# Patient Record
Sex: MALE | Race: WHITE | NOT HISPANIC OR LATINO | URBAN - METROPOLITAN AREA
[De-identification: names, ages, dates, MRNs, and addresses within clinical notes are randomized per-mention and may not be internally consistent; named-entity substitution may affect disease eponyms.]

---

## 2020-10-12 ENCOUNTER — HOSPITAL ENCOUNTER (EMERGENCY)
Facility: HOSPITAL | Age: 75
Discharge: HOME | End: 2020-10-12
Attending: EMERGENCY MEDICINE
Payer: OTHER GOVERNMENT

## 2020-10-12 VITALS
RESPIRATION RATE: 16 BRPM | SYSTOLIC BLOOD PRESSURE: 144 MMHG | HEIGHT: 63 IN | TEMPERATURE: 98 F | BODY MASS INDEX: 26.58 KG/M2 | DIASTOLIC BLOOD PRESSURE: 74 MMHG | HEART RATE: 62 BPM | OXYGEN SATURATION: 95 % | WEIGHT: 150 LBS

## 2020-10-12 DIAGNOSIS — R07.9 CHEST PAIN: ICD-10-CM

## 2020-10-12 DIAGNOSIS — R07.89 NON-CARDIAC CHEST PAIN: Primary | ICD-10-CM

## 2020-10-12 LAB
ALBUMIN SERPL BCP-MCNC: 4.3 G/DL (ref 3.5–5.2)
ALP SERPL-CCNC: 93 U/L (ref 55–135)
ALT SERPL W/O P-5'-P-CCNC: 16 U/L (ref 10–44)
ANION GAP SERPL CALC-SCNC: 11 MMOL/L (ref 8–16)
AST SERPL-CCNC: 21 U/L (ref 10–40)
BASOPHILS # BLD AUTO: 0.04 K/UL (ref 0–0.2)
BASOPHILS NFR BLD: 0.5 % (ref 0–1.9)
BILIRUB SERPL-MCNC: 0.8 MG/DL (ref 0.1–1)
BNP SERPL-MCNC: 21 PG/ML (ref 0–99)
BUN SERPL-MCNC: 10 MG/DL (ref 8–23)
CALCIUM SERPL-MCNC: 9.2 MG/DL (ref 8.7–10.5)
CHLORIDE SERPL-SCNC: 103 MMOL/L (ref 95–110)
CO2 SERPL-SCNC: 27 MMOL/L (ref 23–29)
CREAT SERPL-MCNC: 0.8 MG/DL (ref 0.5–1.4)
DIFFERENTIAL METHOD: ABNORMAL
EOSINOPHIL # BLD AUTO: 0.1 K/UL (ref 0–0.5)
EOSINOPHIL NFR BLD: 1.6 % (ref 0–8)
ERYTHROCYTE [DISTWIDTH] IN BLOOD BY AUTOMATED COUNT: 12.7 % (ref 11.5–14.5)
EST. GFR  (AFRICAN AMERICAN): >60 ML/MIN/1.73 M^2
EST. GFR  (NON AFRICAN AMERICAN): >60 ML/MIN/1.73 M^2
GLUCOSE SERPL-MCNC: 104 MG/DL (ref 70–110)
HCT VFR BLD AUTO: 45.6 % (ref 40–54)
HGB BLD-MCNC: 14.7 G/DL (ref 14–18)
IMM GRANULOCYTES # BLD AUTO: 0.01 K/UL (ref 0–0.04)
IMM GRANULOCYTES NFR BLD AUTO: 0.1 % (ref 0–0.5)
LYMPHOCYTES # BLD AUTO: 3.1 K/UL (ref 1–4.8)
LYMPHOCYTES NFR BLD: 38.1 % (ref 18–48)
MCH RBC QN AUTO: 31.3 PG (ref 27–31)
MCHC RBC AUTO-ENTMCNC: 32.2 G/DL (ref 32–36)
MCV RBC AUTO: 97 FL (ref 82–98)
MONOCYTES # BLD AUTO: 0.8 K/UL (ref 0.3–1)
MONOCYTES NFR BLD: 9.2 % (ref 4–15)
NEUTROPHILS # BLD AUTO: 4.1 K/UL (ref 1.8–7.7)
NEUTROPHILS NFR BLD: 50.5 % (ref 38–73)
NRBC BLD-RTO: 0 /100 WBC
PLATELET # BLD AUTO: 322 K/UL (ref 150–350)
PMV BLD AUTO: 9.8 FL (ref 9.2–12.9)
POTASSIUM SERPL-SCNC: 4.4 MMOL/L (ref 3.5–5.1)
PROT SERPL-MCNC: 7.1 G/DL (ref 6–8.4)
RBC # BLD AUTO: 4.7 M/UL (ref 4.6–6.2)
SODIUM SERPL-SCNC: 141 MMOL/L (ref 136–145)
TROPONIN I SERPL DL<=0.01 NG/ML-MCNC: 0.01 NG/ML (ref 0–0.03)
TROPONIN I SERPL DL<=0.01 NG/ML-MCNC: 0.01 NG/ML (ref 0–0.03)
WBC # BLD AUTO: 8.13 K/UL (ref 3.9–12.7)

## 2020-10-12 PROCEDURE — 93005 ELECTROCARDIOGRAM TRACING: CPT | Performed by: INTERNAL MEDICINE

## 2020-10-12 PROCEDURE — 93010 ELECTROCARDIOGRAM REPORT: CPT | Mod: ,,, | Performed by: INTERNAL MEDICINE

## 2020-10-12 PROCEDURE — 80053 COMPREHEN METABOLIC PANEL: CPT

## 2020-10-12 PROCEDURE — 93010 EKG 12-LEAD: ICD-10-PCS | Mod: ,,, | Performed by: INTERNAL MEDICINE

## 2020-10-12 PROCEDURE — 83880 ASSAY OF NATRIURETIC PEPTIDE: CPT

## 2020-10-12 PROCEDURE — 99285 EMERGENCY DEPT VISIT HI MDM: CPT | Mod: 25

## 2020-10-12 PROCEDURE — 99285 EMERGENCY DEPT VISIT HI MDM: CPT | Mod: ,,, | Performed by: PHYSICIAN ASSISTANT

## 2020-10-12 PROCEDURE — 25000003 PHARM REV CODE 250: Performed by: PHYSICIAN ASSISTANT

## 2020-10-12 PROCEDURE — 99285 PR EMERGENCY DEPT VISIT,LEVEL V: ICD-10-PCS | Mod: ,,, | Performed by: PHYSICIAN ASSISTANT

## 2020-10-12 PROCEDURE — 85025 COMPLETE CBC W/AUTO DIFF WBC: CPT

## 2020-10-12 PROCEDURE — 84484 ASSAY OF TROPONIN QUANT: CPT | Mod: 91

## 2020-10-12 RX ORDER — NAPROXEN SODIUM 220 MG/1
162 TABLET, FILM COATED ORAL
Status: COMPLETED | OUTPATIENT
Start: 2020-10-12 | End: 2020-10-12

## 2020-10-12 RX ADMIN — ASPIRIN 162 MG: 81 TABLET, CHEWABLE ORAL at 08:10

## 2020-10-12 NOTE — ED PROVIDER NOTES
Encounter Date: 10/12/2020       History     Chief Complaint   Patient presents with    Chest Pain     started this morning, denies cardiac hx     75-year-old male with HTN, HLD presents to the ED with a cc of chest pain. Pt describes tightness across the chest that began around 6:30am today.  Pain occurred at rest, but he felt that it was slightly worse with exertion.  He reports some mild associated SOB. Denies diaphoresis, nausea, vomiting.  Currently, he states that the pain has nearly resolved.  Patient took 2 baby aspirins.  He denies cardiac history, but notes that [facility] which was normal.  He also describes having an echo 3 mos ago, which was also normal.  Patient does report having increased life stressors recently.  Denies cough, fever, recent immobilization, calf pain, leg swelling.  He does note that his father  of a heart attack at age 52.        Review of patient's allergies indicates:  No Known Allergies  Past Medical History:   Diagnosis Date    Hyperlipidemia     Hypertension      No past surgical history on file.  No family history on file.  Social History     Tobacco Use    Smoking status: Not on file   Substance Use Topics    Alcohol use: Not on file    Drug use: Not on file     Review of Systems   Constitutional: Negative for diaphoresis and fever.   HENT: Negative for sore throat.    Respiratory: Negative for shortness of breath.    Cardiovascular: Positive for chest pain. Negative for leg swelling.   Gastrointestinal: Negative for abdominal pain, nausea and vomiting.   Genitourinary: Negative for dysuria.   Musculoskeletal: Negative for back pain.   Skin: Negative for rash.   Neurological: Negative for weakness.   Hematological: Does not bruise/bleed easily.       Physical Exam     Initial Vitals [10/12/20 0734]   BP Pulse Resp Temp SpO2   131/72 69 18 97.6 °F (36.4 °C) 98 %      MAP       --         Physical Exam    Nursing note and vitals  reviewed.  Constitutional: He appears well-developed and well-nourished.  Non-toxic appearance. He does not appear ill. No distress.   HENT:   Head: Normocephalic and atraumatic.   Neck: Normal range of motion. Neck supple.   Cardiovascular: Normal rate and regular rhythm. Exam reveals no gallop, no distant heart sounds and no friction rub.    Murmur heard.  Pulses:       Radial pulses are 2+ on the right side and 2+ on the left side.   Pulmonary/Chest: Effort normal and breath sounds normal. No accessory muscle usage. No tachypnea. No respiratory distress. He has no decreased breath sounds. He has no wheezes. He has no rhonchi. He has no rales.   Abdominal: Soft. Normal appearance. He exhibits no distension and no mass. There is no abdominal tenderness. There is no rigidity and no guarding.   Musculoskeletal:      Comments: No calf tenderness.  No peripheral edema.   Neurological: He is alert.   Skin: No rash noted.         ED Course   Procedures  Labs Reviewed   CBC W/ AUTO DIFFERENTIAL - Abnormal; Notable for the following components:       Result Value    Mean Corpuscular Hemoglobin 31.3 (*)     All other components within normal limits   COMPREHENSIVE METABOLIC PANEL   TROPONIN I   B-TYPE NATRIURETIC PEPTIDE   TROPONIN I     EKG Readings: (Independently Interpreted)   Initial Reading: No STEMI. Rhythm: Sinus Bradycardia. Heart Rate: 58. Ectopy: No Ectopy. Axis: Normal.   Nonspecific T-wave abnormality, lead III     ECG Results          EKG 12-lead (Final result)  Result time 10/12/20 14:35:12    Final result by Interface, Lab In [facility] (10/12/20 14:35:12)                 Narrative:    Test Reason : R07.9,    Vent. Rate : 058 BPM     Atrial Rate : 058 BPM     P-R Int : 126 ms          QRS Dur : 092 ms      QT Int : 380 ms       P-R-T Axes : 024 086 029 degrees     QTc Int : 373 ms    Sinus bradycardia with sinus arrhythmia  Otherwise normal ECG  No previous ECGs available  Confirmed by Jaun Mishra MD ([id number])  on 10/12/2020 2:35:04 PM    Referred By: CHER   SELF           Confirmed By:Jaun Mishra MD                            Imaging Results          X-Ray Chest PA And Lateral (Final result)  Result time 10/12/20 08:45:43    Final result by Luz Vu MD (10/12/20 08:45:43)                 Impression:      No source for chest pain identified.      Electronically signed by: Luz Vu MD  Date:    10/12/2020  Time:    08:45             Narrative:    EXAMINATION:  XR CHEST PA AND LATERAL    CLINICAL HISTORY:  Chest pain, unspecified    TECHNIQUE:  PA and lateral views of the chest were performed.    COMPARISON:  None    FINDINGS:  Hypertrophic calcification at the 1st costochondral junctions represents a normal senescent change.    Mediastinal structures are midline. Cardiac silhouette and pulmonary vascular distribution are normal.    Lung volumes are normal and symmetric. I detect no pulmonary disease, pleural fluid, lymph node enlargement, cardiac decompensation, pneumothorax, pneumomediastinum, pneumoperitoneum or significant osseous abnormality.                                 Medical Decision Making:   History:   Old Medical Records: I decided to obtain old medical records.  Differential Diagnosis:   My differential diagnosis includes but is not limited to:  ACS, pneumothorax, pleural effusion, stress/anxiety, GERD  Independently Interpreted Test(s):   I have ordered and independently interpreted EKG Reading(s) - see prior notes  Clinical Tests:   Lab Tests: Ordered  Radiological Study: Ordered  Medical Tests: Ordered       APC / Resident Notes:   75-year-old male presents for evaluation of chest pain that began this morning.  Vitals are within normal limits.  Patient in no acute distress.  Unremarkable physical exam.  EKG without acute ischemic patterns or other concerning arrhythmia.    Plan:  Troponin x2, ASA, CXR, labs    Troponins are both within normal limits.  BMP within normal limits.   Chest x-ray shows no acute abnormalities.  Patient reported continued improvement of his chest pain on reassessment.  Based on negative workup, I do not suspect cardiac etiology of patient's symptoms.  Given his history of prostate cancer, patient is Wells score of 1 which is low risk for PE.  Based on his history, exam, vitals, I have a low suspicion for PE.  I will discharge patient in stable condition.  Advised patient to take 20 mg of Pepcid for the next week.  Advised him to follow-up with PCP if no improvement in his symptoms.  Strict ED return precautions given.  He verbalized understanding is comfortable with plan.    I have reviewed the patient's records and discussed this case with my supervising physician. Please be advised that this text was dictated with [employer] software and may contain dictation errors.                  Attending Attestation:     Physician Attestation Statement for NP/PA:   I discussed this assessment and plan of this patient with the NP/PA, but I did not personally examine the patient. The face to face encounter was performed by the NP/PA.                            Clinical Impression:       ICD-10-CM ICD-9-CM   1. Non-cardiac chest pain  R07.89 786.59   2. Chest pain  R07.9 786.50                      Disposition:   Disposition: Discharged  Condition: Stable     ED Disposition Condition    Discharge Stable        ED Prescriptions     None        Follow-up Information    None                                      Ana Norton PA-C  10/13/20 0804       Carola Garcia MD  10/13/20 1239

## 2020-10-12 NOTE — DISCHARGE INSTRUCTIONS
Your ED evaluation today was normal.  You should follow[facility] with [facility] if your [facility] continue.     Return to the ER for any new or significantly worsening symptoms such as difficulty breathing, severe chest pain, fever greater than 100.4° or any other worrisome symptoms.    Our goal in the emergency department is to always give you outstanding care and exceptional service. You may receive a survey by mail or e-mail in the next week regarding your experience in our ED. We would greatly appreciate your completing and returning the survey. Your feedback provides us with a way to recognize our staff who give very good care and it helps us learn how to improve when your experience was below our aspiration of excellence.

## 2020-10-12 NOTE — ED NOTES
Patient arrived ambulatory. Placed on cardiac monitor, patient has no needs at this time.  Will continue to monitor.

## 2020-10-12 NOTE — ED NOTES
Patient identifiers for Blayne Garcia 75 y.o. male checked and correct.  Chief Complaint   Patient presents with    Chest Pain     started this morning, denies cardiac hx     No past medical history on file.  Allergies reported: Review of patient's allergies indicates:  No Known Allergies     Chest pain beginning at 6:30am. Pt describes chest pain as tightness and the pain is intermitted. 4/10 pain at rest, 6/10 upon ambulation. Pt took 2 baby aspirin 30 min prior to arrival. Denies N/V/D    LOC: Patient is awake, alert, and aware of environment with an appropriate affect. Patient is oriented x 4 and speaking appropriately.  APPEARANCE: Patient resting comfortably and in no acute distress. Patient is clean and well groomed, patient's clothing is properly fastened.  HEENT: Pt presents with surgical mask on.   SKIN: The skin is warm and dry. Patient has normal skin turgor and moist mucus membranes.   MUSKULOSKELETAL: Patient is moving all extremities well, no obvious deformities noted. Pulses intact.   RESPIRATORY: Airway is open and patent. Respirations are spontaneous and non-labored with normal effort and rate.  CARDIAC: Patient has a normal rate and rhythm.   ABDOMEN: No distention noted. Soft and non-tender upon palpation.  NEUROLOGICAL: pupils 2mm, PERRL. Facial expression is symmetrical. Hand grasps are equal bilaterally. Normal sensation in all extremities when touched with finger.

## 2020-10-12 NOTE — PROVIDER PROGRESS NOTES - EMERGENCY DEPT.
Encounter Date: 10/12/2020    ED Physician Progress Notes         EKG - STEMI Decision  Initial Reading: No STEMI present.